# Patient Record
Sex: MALE | HISPANIC OR LATINO | Employment: FULL TIME | URBAN - METROPOLITAN AREA
[De-identification: names, ages, dates, MRNs, and addresses within clinical notes are randomized per-mention and may not be internally consistent; named-entity substitution may affect disease eponyms.]

---
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
## 2024-03-05 ENCOUNTER — TELEPHONE (OUTPATIENT)
Dept: INTERNAL MEDICINE | Facility: CLINIC | Age: 19
End: 2024-03-05

## 2024-03-05 ENCOUNTER — OFFICE VISIT (OUTPATIENT)
Dept: INTERNAL MEDICINE | Facility: CLINIC | Age: 19
End: 2024-03-05
Payer: COMMERCIAL

## 2024-03-05 ENCOUNTER — PATIENT MESSAGE (OUTPATIENT)
Dept: INTERNAL MEDICINE | Facility: CLINIC | Age: 19
End: 2024-03-05

## 2024-03-05 VITALS
WEIGHT: 190 LBS | HEART RATE: 92 BPM | TEMPERATURE: 98 F | HEIGHT: 67 IN | DIASTOLIC BLOOD PRESSURE: 64 MMHG | SYSTOLIC BLOOD PRESSURE: 106 MMHG | BODY MASS INDEX: 29.82 KG/M2

## 2024-03-05 DIAGNOSIS — K13.79 MOUTH SORE: Primary | ICD-10-CM

## 2024-03-05 PROCEDURE — 99213 OFFICE O/P EST LOW 20 MIN: CPT | Performed by: STUDENT IN AN ORGANIZED HEALTH CARE EDUCATION/TRAINING PROGRAM

## 2024-03-05 RX ORDER — TRIAMCINOLONE ACETONIDE 0.1 %
PASTE (GRAM) DENTAL 2 TIMES DAILY
Qty: 5 G | Refills: 1 | Status: SHIPPED | OUTPATIENT
Start: 2024-03-05

## 2024-03-05 RX ORDER — DIPHENHYDRAMINE, LIDOCAINE, NYSTATIN
5 KIT ORAL 4 TIMES DAILY
Qty: 200 ML | Refills: 1 | Status: SHIPPED | OUTPATIENT
Start: 2024-03-05

## 2024-03-05 NOTE — PROGRESS NOTES
Chief Complaint  Srinivas Denney is a 19 y.o. male presenting for Mouth Lesions.     Born in [facility], moved to [place] when he was 3 months old. Moved to [place] 2019. Lives with parents. Single. 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     Patient has a past Chiara history of mild intermittent asthma, seasonal allergies and acne (was on Accutane by Derm).    History of Present Illness  Patient is in sophomore year of college, he is currently doing finals, but was able to come in for in person visit today.    Patient is here for acute visit due to 1.5 weeks of sore office mouth on the right side, just above the back molar.  See also image patient sent through [employer].  He does not recall any episodes of biting himself, just noticed this developing.  He thinks it might be from a bite.  He has had sores in the past, his previous PCP treated with triamcinolone acetonide oral paste.  He also was treated with a solution for symptom relief, he is not sure if it might have been Magic mouthwash.  He has had mouth sores more on the inside of his lower lip in the past, typically bilateral symptoms, not outside lesions or one-sided lesions.  He is not aware of any history of HSV.  No fever or chills.  No tooth pain.  No congestion.  It hurts when eating.  Feels like a flap that comes in the right.    The following portions of the patient's history were reviewed and updated as appropriate: allergies, current medications, past family history, past medical history, past social history, past surgical history, and problem list.    Image appears to be on the left side, however this also is on the right side, assuming image was captured through the mirror       File Link    Scan on 3/5/2024 0743: [id number]        Key Information    Document ID File Type Document Type Description   [id number] Image Patient Entered Attachment [id number]     Import Information    Attached At Date Time User Dept  "  Encounter Level 3/5/2024  7:43 AM       Encounter    Patient Message on 3/5/24 with Janes Smith MD         Objective  /64 (BP Location: Left arm, Patient Position: Sitting, Cuff Size: Adult)   Pulse 92   Temp 98 °F (36.7 °C) (Temporal)   Ht 170.2 cm (67.01\")   Wt 86.2 kg (190 lb)   BMI 29.75 kg/m²     Physical Exam  Vitals reviewed.   Constitutional:       Appearance: Normal appearance.   HENT:      Head: Normocephalic and atraumatic.      Right Ear: Tympanic membrane, ear canal and external ear normal. There is no impacted cerumen.      Left Ear: Tympanic membrane, ear canal and external ear normal. There is no impacted cerumen.      Nose: Nose normal. No congestion.      Mouth/Throat:      Mouth: Mucous membranes are moist.     Eyes:      Extraocular Movements: Extraocular movements intact.      Conjunctiva/sclera: Conjunctivae normal.   Cardiovascular:      Rate and Rhythm: Normal rate and regular rhythm.      Heart sounds: Normal heart sounds. No murmur heard.  Pulmonary:      Effort: Pulmonary effort is normal.      Breath sounds: Normal breath sounds.   Musculoskeletal:      Cervical back: Neck supple. No tenderness.   Lymphadenopathy:      Cervical: No cervical adenopathy.   Skin:     General: Skin is warm and dry.   Neurological:      Mental Status: He is alert and oriented to person, place, and time. Mental status is at baseline.   Psychiatric:         Behavior: Behavior normal.         Thought Content: Thought content normal.         Assessment/Plan   1. Mouth sore  Could be accidental bite.  Appears to be also small aphthous sore.  Will do trial of triamcinolone paste and Magic mouthwash.  If this continues or does not heal up I would recommend evaluation by dentist, they might be able to accommodate.  Otherwise if this lasts more than 2-3 weeks and he continues to feel the small flap of mucous membrane getting caught between the teeth, I would recommend evaluation by ENT.  He can get " back in touch with me at any time for this.  - triamcinolone (KENALOG) 0.1 % paste; Apply  to teeth 2 (Two) Times a Day.  Dispense: 5 g; Refill: 1  - nystatin susp + lidocaine viscous (MAGIC MOUTHWASH) oral suspension; Swish and swallow 5 mL 4 (Four) Times a Day.  Dispense: 200 mL; Refill: 1      Return for Annual physical 2-3 months.    Janes Smith MD  Family Medicine  03/05/2024

## 2024-03-05 NOTE — TELEPHONE ENCOUNTER
Pharmacy Name: [id number] - [place], [place] - [address] AT [facility] [address] - [phone]  - [phone] FX     Reference Number (if applicable):     Pharmacy representative name:    Pharmacy representative phone number: [phone]     What medication are you calling in regards to: NISTATIN    What question does the pharmacy have: CLARIFY DIRECTIONS    Who is the provider that prescribed the medication: HOLESTOL    Additional notes:

## 2024-03-05 NOTE — TELEPHONE ENCOUNTER
From: Srinivas Denney  Sent: 3/5/2024 9:16 AM EST  To: [facility] [address] [facility] [facility]  Subject: Ulcer inside cheek    Okay, I understand. I have class and midterms today, however if you could provide a doctor’s note then I will gladly make my way over.     Do you have any openings today?

## 2024-03-05 NOTE — TELEPHONE ENCOUNTER
This call was not about directions. They do not do compounded meds. They asked if they could dispense the two ingredients separately and have the patient combine the ingredients at home. I stated that would be fine as long as the patient feels comfortable doing so.

## 2024-03-14 DIAGNOSIS — J45.20 MILD INTERMITTENT ASTHMA WITHOUT COMPLICATION: Chronic | ICD-10-CM

## 2024-03-14 DIAGNOSIS — J30.1 SEASONAL ALLERGIC RHINITIS DUE TO POLLEN: Chronic | ICD-10-CM

## 2024-03-14 RX ORDER — MONTELUKAST SODIUM 10 MG/1
10 TABLET ORAL NIGHTLY
Qty: 30 TABLET | Refills: 5 | Status: SHIPPED | OUTPATIENT
Start: 2024-03-14

## 2024-03-22 DIAGNOSIS — J45.20 MILD INTERMITTENT ASTHMA WITHOUT COMPLICATION: Chronic | ICD-10-CM

## 2024-03-22 DIAGNOSIS — J30.1 SEASONAL ALLERGIC RHINITIS DUE TO POLLEN: Chronic | ICD-10-CM

## 2024-03-22 RX ORDER — MONTELUKAST SODIUM 10 MG/1
10 TABLET ORAL NIGHTLY
Qty: 30 TABLET | Refills: 5 | OUTPATIENT
Start: 2024-03-22

## 2024-06-11 ENCOUNTER — TELEPHONE (OUTPATIENT)
Dept: INTERNAL MEDICINE | Facility: CLINIC | Age: 19
End: 2024-06-11
Payer: COMMERCIAL

## 2024-06-11 ENCOUNTER — HOSPITAL ENCOUNTER (EMERGENCY)
Facility: HOSPITAL | Age: 19
Discharge: HOME | End: 2024-06-11
Attending: EMERGENCY MEDICINE | Admitting: EMERGENCY MEDICINE
Payer: COMMERCIAL

## 2024-06-11 ENCOUNTER — APPOINTMENT (OUTPATIENT)
Facility: HOSPITAL | Age: 19
End: 2024-06-11
Payer: COMMERCIAL

## 2024-06-11 ENCOUNTER — LAB (OUTPATIENT)
Dept: LAB | Facility: HOSPITAL | Age: 19
End: 2024-06-11
Payer: COMMERCIAL

## 2024-06-11 ENCOUNTER — OFFICE VISIT (OUTPATIENT)
Dept: INTERNAL MEDICINE | Facility: CLINIC | Age: 19
End: 2024-06-11
Payer: COMMERCIAL

## 2024-06-11 VITALS
DIASTOLIC BLOOD PRESSURE: 75 MMHG | SYSTOLIC BLOOD PRESSURE: 100 MMHG | BODY MASS INDEX: 29.66 KG/M2 | HEIGHT: 67 IN | OXYGEN SATURATION: 100 % | TEMPERATURE: 98 F | RESPIRATION RATE: 16 BRPM | HEART RATE: 74 BPM | WEIGHT: 189 LBS

## 2024-06-11 VITALS
HEIGHT: 67 IN | HEART RATE: 88 BPM | DIASTOLIC BLOOD PRESSURE: 68 MMHG | BODY MASS INDEX: 29.66 KG/M2 | WEIGHT: 189 LBS | SYSTOLIC BLOOD PRESSURE: 122 MMHG | TEMPERATURE: 98 F

## 2024-06-11 DIAGNOSIS — H53.9 VISION CHANGES: ICD-10-CM

## 2024-06-11 DIAGNOSIS — Z11.59 ENCOUNTER FOR HEPATITIS C SCREENING TEST FOR LOW RISK PATIENT: ICD-10-CM

## 2024-06-11 DIAGNOSIS — R51.9 ACUTE NONINTRACTABLE HEADACHE, UNSPECIFIED HEADACHE TYPE: ICD-10-CM

## 2024-06-11 DIAGNOSIS — R42 DIZZINESS: ICD-10-CM

## 2024-06-11 DIAGNOSIS — H53.8 BLURRED VISION: ICD-10-CM

## 2024-06-11 DIAGNOSIS — R42 DIZZINESS: Primary | ICD-10-CM

## 2024-06-11 DIAGNOSIS — R51.9 NONINTRACTABLE EPISODIC HEADACHE, UNSPECIFIED HEADACHE TYPE: Primary | ICD-10-CM

## 2024-06-11 DIAGNOSIS — R51.9 ACUTE NONINTRACTABLE HEADACHE, UNSPECIFIED HEADACHE TYPE: Primary | ICD-10-CM

## 2024-06-11 LAB
25(OH)D3 SERPL-MCNC: 15.1 NG/ML (ref 30–100)
ALBUMIN SERPL-MCNC: 4.6 G/DL (ref 3.5–5.2)
ALBUMIN SERPL-MCNC: 4.8 G/DL (ref 3.5–5.2)
ALBUMIN/GLOB SERPL: 1.7 G/DL
ALBUMIN/GLOB SERPL: 1.8 G/DL
ALP SERPL-CCNC: 86 U/L (ref 39–117)
ALP SERPL-CCNC: 89 U/L (ref 39–117)
ALT SERPL W P-5'-P-CCNC: 14 U/L (ref 1–41)
ALT SERPL W P-5'-P-CCNC: 16 U/L (ref 1–41)
AMPHET+METHAMPHET UR QL: NEGATIVE
AMPHETAMINES UR QL: NEGATIVE
ANION GAP SERPL CALCULATED.3IONS-SCNC: 10.6 MMOL/L (ref 5–15)
ANION GAP SERPL CALCULATED.3IONS-SCNC: 10.9 MMOL/L (ref 5–15)
AST SERPL-CCNC: 17 U/L (ref 1–40)
AST SERPL-CCNC: 21 U/L (ref 1–40)
BARBITURATES UR QL SCN: NEGATIVE
BASOPHILS # BLD AUTO: 0.03 10*3/MM3 (ref 0–0.2)
BASOPHILS # BLD AUTO: 0.05 10*3/MM3 (ref 0–0.2)
BASOPHILS NFR BLD AUTO: 0.5 % (ref 0–1.5)
BASOPHILS NFR BLD AUTO: 0.8 % (ref 0–1.5)
BENZODIAZ UR QL SCN: NEGATIVE
BILIRUB SERPL-MCNC: 0.3 MG/DL (ref 0–1.2)
BILIRUB SERPL-MCNC: 0.4 MG/DL (ref 0–1.2)
BUN SERPL-MCNC: 16 MG/DL (ref 6–20)
BUN SERPL-MCNC: 18 MG/DL (ref 6–20)
BUN/CREAT SERPL: 18.6 (ref 7–25)
BUN/CREAT SERPL: 22.2 (ref 7–25)
BUPRENORPHINE SERPL-MCNC: NEGATIVE NG/ML
CALCIUM SPEC-SCNC: 9.2 MG/DL (ref 8.6–10.5)
CALCIUM SPEC-SCNC: 9.6 MG/DL (ref 8.6–10.5)
CANNABINOIDS SERPL QL: NEGATIVE
CHLORIDE SERPL-SCNC: 104 MMOL/L (ref 98–107)
CHLORIDE SERPL-SCNC: 105 MMOL/L (ref 98–107)
CHOLEST SERPL-MCNC: 169 MG/DL (ref 0–200)
CO2 SERPL-SCNC: 22.4 MMOL/L (ref 22–29)
CO2 SERPL-SCNC: 24.1 MMOL/L (ref 22–29)
COCAINE UR QL: NEGATIVE
CREAT SERPL-MCNC: 0.81 MG/DL (ref 0.76–1.27)
CREAT SERPL-MCNC: 0.86 MG/DL (ref 0.76–1.27)
DEPRECATED RDW RBC AUTO: 37.4 FL (ref 37–54)
DEPRECATED RDW RBC AUTO: 38.8 FL (ref 37–54)
EGFRCR SERPLBLD CKD-EPI 2021: 127.1 ML/MIN/1.73
EGFRCR SERPLBLD CKD-EPI 2021: 129.4 ML/MIN/1.73
EOSINOPHIL # BLD AUTO: 0.18 10*3/MM3 (ref 0–0.4)
EOSINOPHIL # BLD AUTO: 0.19 10*3/MM3 (ref 0–0.4)
EOSINOPHIL NFR BLD AUTO: 2.9 % (ref 0.3–6.2)
EOSINOPHIL NFR BLD AUTO: 3 % (ref 0.3–6.2)
ERYTHROCYTE [DISTWIDTH] IN BLOOD BY AUTOMATED COUNT: 12.4 % (ref 12.3–15.4)
ERYTHROCYTE [DISTWIDTH] IN BLOOD BY AUTOMATED COUNT: 12.9 % (ref 12.3–15.4)
FENTANYL UR-MCNC: NEGATIVE NG/ML
GLOBULIN UR ELPH-MCNC: 2.7 GM/DL
GLOBULIN UR ELPH-MCNC: 2.7 GM/DL
GLUCOSE SERPL-MCNC: 102 MG/DL (ref 65–99)
GLUCOSE SERPL-MCNC: 81 MG/DL (ref 65–99)
HBA1C MFR BLD: 5.3 % (ref 4.8–5.6)
HCT VFR BLD AUTO: 40.4 % (ref 37.5–51)
HCT VFR BLD AUTO: 42.9 % (ref 37.5–51)
HCV AB SER QL: NORMAL
HDLC SERPL-MCNC: 47 MG/DL (ref 40–60)
HGB BLD-MCNC: 13.9 G/DL (ref 13–17.7)
HGB BLD-MCNC: 14.7 G/DL (ref 13–17.7)
HOLD SPECIMEN: NORMAL
IMM GRANULOCYTES # BLD AUTO: 0 10*3/MM3 (ref 0–0.05)
IMM GRANULOCYTES # BLD AUTO: 0.02 10*3/MM3 (ref 0–0.05)
IMM GRANULOCYTES NFR BLD AUTO: 0 % (ref 0–0.5)
IMM GRANULOCYTES NFR BLD AUTO: 0.3 % (ref 0–0.5)
LDLC SERPL CALC-MCNC: 93 MG/DL (ref 0–100)
LDLC/HDLC SERPL: 1.88 {RATIO}
LYMPHOCYTES # BLD AUTO: 2.7 10*3/MM3 (ref 0.7–3.1)
LYMPHOCYTES # BLD AUTO: 2.7 10*3/MM3 (ref 0.7–3.1)
LYMPHOCYTES NFR BLD AUTO: 41.7 % (ref 19.6–45.3)
LYMPHOCYTES NFR BLD AUTO: 45.6 % (ref 19.6–45.3)
MCH RBC QN AUTO: 28.5 PG (ref 26.6–33)
MCH RBC QN AUTO: 28.5 PG (ref 26.6–33)
MCHC RBC AUTO-ENTMCNC: 34.3 G/DL (ref 31.5–35.7)
MCHC RBC AUTO-ENTMCNC: 34.4 G/DL (ref 31.5–35.7)
MCV RBC AUTO: 83 FL (ref 79–97)
MCV RBC AUTO: 83.3 FL (ref 79–97)
METHADONE UR QL SCN: NEGATIVE
MONOCYTES # BLD AUTO: 0.52 10*3/MM3 (ref 0.1–0.9)
MONOCYTES # BLD AUTO: 0.53 10*3/MM3 (ref 0.1–0.9)
MONOCYTES NFR BLD AUTO: 8 % (ref 5–12)
MONOCYTES NFR BLD AUTO: 9 % (ref 5–12)
NEUTROPHILS NFR BLD AUTO: 2.44 10*3/MM3 (ref 1.7–7)
NEUTROPHILS NFR BLD AUTO: 3.04 10*3/MM3 (ref 1.7–7)
NEUTROPHILS NFR BLD AUTO: 41.3 % (ref 42.7–76)
NEUTROPHILS NFR BLD AUTO: 46.9 % (ref 42.7–76)
NRBC BLD AUTO-RTO: 0 /100 WBC (ref 0–0.2)
OPIATES UR QL: NEGATIVE
OXYCODONE UR QL SCN: NEGATIVE
PCP UR QL SCN: NEGATIVE
PLATELET # BLD AUTO: 256 10*3/MM3 (ref 140–450)
PLATELET # BLD AUTO: 290 10*3/MM3 (ref 140–450)
PMV BLD AUTO: 9.4 FL (ref 6–12)
PMV BLD AUTO: 9.5 FL (ref 6–12)
POTASSIUM SERPL-SCNC: 3.7 MMOL/L (ref 3.5–5.2)
POTASSIUM SERPL-SCNC: 4 MMOL/L (ref 3.5–5.2)
PROT SERPL-MCNC: 7.3 G/DL (ref 6–8.5)
PROT SERPL-MCNC: 7.5 G/DL (ref 6–8.5)
RBC # BLD AUTO: 4.87 10*6/MM3 (ref 4.14–5.8)
RBC # BLD AUTO: 5.15 10*6/MM3 (ref 4.14–5.8)
SODIUM SERPL-SCNC: 137 MMOL/L (ref 136–145)
SODIUM SERPL-SCNC: 140 MMOL/L (ref 136–145)
TRICYCLICS UR QL SCN: NEGATIVE
TRIGL SERPL-MCNC: 169 MG/DL (ref 0–150)
TROPONIN T SERPL HS-MCNC: 7 NG/L
TSH SERPL DL<=0.05 MIU/L-ACNC: 1.53 UIU/ML (ref 0.27–4.2)
VIT B12 BLD-MCNC: 405 PG/ML (ref 211–946)
VLDLC SERPL-MCNC: 29 MG/DL (ref 5–40)
WBC NRBC COR # BLD AUTO: 5.92 10*3/MM3 (ref 3.4–10.8)
WBC NRBC COR # BLD AUTO: 6.48 10*3/MM3 (ref 3.4–10.8)
WHOLE BLOOD HOLD COAG: NORMAL
WHOLE BLOOD HOLD SPECIMEN: NORMAL

## 2024-06-11 PROCEDURE — 83036 HEMOGLOBIN GLYCOSYLATED A1C: CPT

## 2024-06-11 PROCEDURE — 82306 VITAMIN D 25 HYDROXY: CPT

## 2024-06-11 PROCEDURE — 80050 GENERAL HEALTH PANEL: CPT

## 2024-06-11 PROCEDURE — 84484 ASSAY OF TROPONIN QUANT: CPT | Performed by: PHYSICIAN ASSISTANT

## 2024-06-11 PROCEDURE — 86803 HEPATITIS C AB TEST: CPT

## 2024-06-11 PROCEDURE — 80053 COMPREHEN METABOLIC PANEL: CPT | Performed by: PHYSICIAN ASSISTANT

## 2024-06-11 PROCEDURE — 70551 MRI BRAIN STEM W/O DYE: CPT

## 2024-06-11 PROCEDURE — 99214 OFFICE O/P EST MOD 30 MIN: CPT

## 2024-06-11 PROCEDURE — 85025 COMPLETE CBC W/AUTO DIFF WBC: CPT | Performed by: PHYSICIAN ASSISTANT

## 2024-06-11 PROCEDURE — 80307 DRUG TEST PRSMV CHEM ANLYZR: CPT | Performed by: PHYSICIAN ASSISTANT

## 2024-06-11 PROCEDURE — 80061 LIPID PANEL: CPT

## 2024-06-11 PROCEDURE — 82607 VITAMIN B-12: CPT

## 2024-06-11 PROCEDURE — 99284 EMERGENCY DEPT VISIT MOD MDM: CPT

## 2024-06-11 PROCEDURE — 93005 ELECTROCARDIOGRAM TRACING: CPT | Performed by: PHYSICIAN ASSISTANT

## 2024-06-11 RX ORDER — SODIUM CHLORIDE 0.9 % (FLUSH) 0.9 %
10 SYRINGE (ML) INJECTION AS NEEDED
Status: DISCONTINUED | OUTPATIENT
Start: 2024-06-11 | End: 2024-06-11 | Stop reason: HOSPADM

## 2024-06-11 NOTE — TELEPHONE ENCOUNTER
THE PATIENTS MOTHER STATES THAT HE JUST SAW KEVIN GUZMANER TODAY AND WAS ADVISED TO GET AN EYE EXAM THE PATIENTS MOTHER STATES THAT THE PATIENT CAN'T GET A SAME DAY EYE APPOINTMENT SHE HAS CALLED SEVERAL OFFICE AND NO ONE CAN SEE HIM TODAY THE PATIENTS MOTHER STATES THAT THE EARLIEST APPOINTMENT THAT SHE CAN GET IS FRIDAY

## 2024-06-11 NOTE — TELEPHONE ENCOUNTER
Patients mom notified. I advised her to check [employer]. The website showed 15 openings for this afternoon.

## 2024-06-11 NOTE — PROGRESS NOTES
"    Acute Office Visit      Name: Srinivas Denney    : 2005     MRN: [id number]   Care Team: Patient Care Team:  Janes Smith MD as PCP - General (Family Medicine)    Chief Complaint  Dizziness (Ongoing for a month. ), Eye Problem (During the dizzy spells the his vision becomes blurry. ), and Headache    Subjective     History of Present Illness:    Srinivas is a pleasant 19-year-old male who presents today for dizziness, frequent headaches, and visual changes.  He reports these symptoms for the past 4 to 5 weeks.    He has accompanied to his appointment today by an adult female.    Srinivas states that he can have dizzy spells at all different times of the day.  This occurs 3-4 times a week.  He describes the dizziness as the ground moving beneath him.  He typically will sit down at the kitchen table for a few minutes until symptoms resolve.  He has not noticed if position changes, such as lying down, improves his symptoms.    During these dizzy spells, he can also note some visual changes.  The visual changes will occasionally remain after dizziness has resolved.  He states that it can be both eyes affected however, today, his right eye is slightly blurry.  He states that the blurry vision typically last 2-4 hours and then his vision clears.  He denies a recent eye exam to have his vision checked.    Srinivas also states that he has been having \"pounding\" headaches in his forehead area for approximately 3-4 weeks.  He states that the pain is unbearable and requires him to squeeze his eyes shut.  He states that these headaches can last all day.  He does not have any improvement with a dark or quiet room, or the use of ibuprofen 800 mg.  He states that the headache just eventually goes away later in the day.    Review of Systems:  Dizziness, visual changes, headaches  Past Medical History:   Diagnosis Date    Acne vulgaris 2022    Was on Accutane by Derm in the past    Mild intermittent asthma " "without complication 11/18/2022    Worse with exercise, infections and allergy season.    Seasonal allergic rhinitis due to pollen 11/18/2022    Spring, fall, wintherr       History reviewed. No pertinent surgical history.    Social History     Socioeconomic History    Marital status: Single   Tobacco Use    Smoking status: Never    Smokeless tobacco: Never   Vaping Use    Vaping status: Never Used   Substance and Sexual Activity    Alcohol use: Never    Drug use: Never    Sexual activity: Defer         Current Outpatient Medications:     albuterol sulfate  (90 Base) MCG/ACT inhaler, Inhale 2 puffs Every 4 (Four) Hours As Needed for Wheezing., Disp: 18 g, Rfl: 3    cyclobenzaprine (FLEXERIL) 10 MG tablet, Take 0.5-1 tablets by mouth 2 (Two) Times a Day As Needed for Muscle Spasms., Disp: 30 tablet, Rfl: 1    montelukast (SINGULAIR) 10 MG tablet, Take 1 tablet by mouth Every Night., Disp: 30 tablet, Rfl: 5    nystatin (MYCOSTATIN) 100,000 unit/mL suspension, MIX WITH EQUAL VOLUME OF VISCOUS LIDOCAINE SWISH AND SWALLOW 5 MILLILITERS FOUR TIMES A DAY, Disp: , Rfl:     triamcinolone (KENALOG) 0.1 % paste, Apply  to teeth 2 (Two) Times a Day., Disp: 5 g, Rfl: 1    Procedures    PHQ-9 Total Score:      Objective     Vital Signs  /68 (BP Location: Left arm, Patient Position: Sitting, Cuff Size: Adult)   Pulse 88   Temp 98 °F (36.7 °C) (Temporal)   Ht 170.2 cm (67.01\")   Wt 85.7 kg (189 lb)   BMI 29.59 kg/m²   Estimated body mass index is 29.59 kg/m² as calculated from the following:    Height as of this encounter: 170.2 cm (67.01\").    Weight as of this encounter: 85.7 kg (189 lb).    BMI is >= 25 and <30. (Overweight) The following options were offered after discussion;: exercise counseling/recommendations and nutrition counseling/recommendations      Physical Exam  Vitals and nursing note reviewed.   HENT:      Head: Normocephalic.      Right Ear: Tympanic membrane, ear canal and external ear normal.    "   Left Ear: Tympanic membrane, ear canal and external ear normal.      Nose: Nose normal.   Eyes:      Pupils: Pupils are equal, round, and reactive to light.   Cardiovascular:      Rate and Rhythm: Normal rate.   Pulmonary:      Effort: Pulmonary effort is normal.      Breath sounds: Normal breath sounds.   Skin:     General: Skin is warm and dry.   Neurological:      General: No focal deficit present.      Mental Status: He is alert and oriented to person, place, and time.      Cranial Nerves: Cranial nerves 2-12 are intact.      Sensory: Sensation is intact.      Motor: Motor function is intact.      Coordination: Coordination is intact.      Gait: Gait is intact.   Psychiatric:         Mood and Affect: Mood normal.         Behavior: Behavior normal.         Thought Content: Thought content normal.         Judgment: Judgment normal.          Assessment and Plan     Assessment/Plan:  Diagnoses and all orders for this visit:    1. Acute nonintractable headache, unspecified headache type (Primary)  -     CBC & Differential; Future  -     Comprehensive Metabolic Panel; Future  -     Lipid Panel; Future  -     TSH Rfx On Abnormal To Free T4; Future  -     Vitamin D,25-Hydroxy; Future  -     Vitamin B12; Future  -     Hemoglobin A1c; Future  -Will proceed with a full lab evaluation today.  -Encouraged to seek a same-day eye exam for vision evaluation.  -Will evaluate these then proceed with plan.  -Informed Srinivas of plan and he is agreeable.    2. Dizziness  -     CBC & Differential; Future  -     Comprehensive Metabolic Panel; Future  -     Lipid Panel; Future  -     TSH Rfx On Abnormal To Free T4; Future  -     Vitamin D,25-Hydroxy; Future  -     Vitamin B12; Future  -     Hemoglobin A1c; Future    3. Vision changes  -     CBC & Differential; Future  -     Comprehensive Metabolic Panel; Future  -     Lipid Panel; Future  -     TSH Rfx On Abnormal To Free T4; Future  -     Vitamin D,25-Hydroxy; Future  -      Vitamin B12; Future  -     Hemoglobin A1c; Future    4. Encounter for hepatitis C screening test for low risk patient  -     Hepatitis C Antibody; Future       There are no Patient Instructions on file for this visit.  Plan of care reviewed with patient at the conclusion of today's visit. Education was provided regarding diagnosis, management and any prescribed or recommended OTC medications.  Patient verbalizes understanding of and agreement with management plan.    Follow Up  Return for Next Scheduled Follow up.    Amelia Youssef, APRN

## 2024-06-11 NOTE — FSED PROVIDER NOTE
Subjective   History of Present Illness  A 19-year-old male presents the ER with intermittent headaches and blurry vision for the past month.  Patient denies any injuries or trauma to his head.  He states he has a history of headaches and has been seen by neurologist in the past.  Patient states he does not currently have a headache.  He is complaining of some blurry vision in his right eye.  He denies any treatment prior to arrival.    History provided by:  Patient and relative      Review of Systems   Constitutional:  Negative for chills and fever.   HENT:  Negative for ear pain, rhinorrhea and sore throat.    Eyes:         Blurred vision   Respiratory:  Negative for cough and shortness of breath.    Cardiovascular:  Negative for chest pain.   Gastrointestinal:  Negative for abdominal pain, diarrhea, nausea and vomiting.   Genitourinary:  Negative for dysuria.   Musculoskeletal:  Negative for back pain and neck pain.   Neurological:  Positive for headaches.   All other systems reviewed and are negative.      Past Medical History:   Diagnosis Date    Acne vulgaris 11/18/2022    Was on Accutane by Derm in the past    Mild intermittent asthma without complication 11/18/2022    Worse with exercise, infections and allergy season.    Seasonal allergic rhinitis due to pollen 11/18/2022    Spring, fall, wintherr       No Known Allergies    History reviewed. No pertinent surgical history.    History reviewed. No pertinent family history.    Social History     Socioeconomic History    Marital status: Single   Tobacco Use    Smoking status: Never    Smokeless tobacco: Never   Vaping Use    Vaping status: Never Used   Substance and Sexual Activity    Alcohol use: Never    Drug use: Never    Sexual activity: Defer           Objective   Physical Exam  Vitals and nursing note reviewed.   Constitutional:       Appearance: Normal appearance. He is normal weight.   HENT:      Head: Normocephalic and atraumatic.   Eyes:       Extraocular Movements: Extraocular movements intact.      Pupils: Pupils are equal, round, and reactive to light.   Cardiovascular:      Rate and Rhythm: Normal rate and regular rhythm.      Pulses: Normal pulses.   Pulmonary:      Effort: Pulmonary effort is normal.      Breath sounds: Normal breath sounds.   Abdominal:      General: Abdomen is flat. Bowel sounds are normal.      Palpations: Abdomen is soft.   Musculoskeletal:         General: Normal range of motion.   Skin:     General: Skin is warm and dry.   Neurological:      General: No focal deficit present.      Mental Status: He is alert and oriented to person, place, and time.   Psychiatric:         Mood and Affect: Mood normal.         Procedures           ED Course  ED Course as of 06/11/24 1634   Tue Jun 11, 2024   1445 Glucose(!): 102 [WA]   1446 Creatinine: 0.81 [WA]   1446 BUN: 18 [WA]   1446 Potassium: 3.7 [WA]   1446 Sodium: 137 [WA]   1446 HS Troponin T: 7 [WA]   1446 WBC: 6.48 [WA]   1446 Hemoglobin: 13.9 [WA]   1446 Hematocrit: 40.4 [WA]      ED Course User Index  [WA] Nel Goldstein PA-C                                           Medical Decision Making  Patient was evaluated, labs and imaging were obtained.  No acute abnormalities were noted.  Patient advised to follow-up with neurology, call to schedule an appointment.  Return to the ER for worsening of symptoms.  Patient has no further questions or concerns at discharge.     Amount and/or Complexity of Data Reviewed  Labs: ordered. Decision-making details documented in ED Course.  Radiology: ordered.  ECG/medicine tests: ordered.    Risk  Prescription drug management.        Final diagnoses:   Nonintractable episodic headache, unspecified headache type   Blurred vision       ED Disposition  ED Disposition       ED Disposition   Discharge    Condition   Stable    Comment   --               [facility]  [address] [place] [id number]  [phone]  Schedule an  appointment as soon as possible for a visit            Medication List      No changes were made to your prescriptions during this visit.

## 2024-06-11 NOTE — TELEPHONE ENCOUNTER
Caller: LUIS GRANADOS    Relationship: Mother    Best call back number: [phone]     What is the medical concern/diagnosis: BLURRY VISION    What specialty or service is being requested: NEUROLOGY    Any additional details: PATIENTS MOTHER STATES THAT HE WENT TO THE EMERGENCY ROOM ON 6/11/24 AND HE HAD AN MRI WHICH WAS NEGATIVE AND TOLD HIM HE NEEDS TO SEE A NEUROLOGIST WITHIN THE NEXT 2 DAYS

## 2024-06-12 DIAGNOSIS — E55.9 VITAMIN D DEFICIENCY: Primary | ICD-10-CM

## 2024-06-12 RX ORDER — ERGOCALCIFEROL 1.25 MG/1
50000 CAPSULE ORAL WEEKLY
Qty: 5 CAPSULE | Refills: 2 | Status: SHIPPED | OUTPATIENT
Start: 2024-06-12

## 2024-06-12 NOTE — TELEPHONE ENCOUNTER
"LVM for pt's mother stating the following:    \"Referral was placed for neurology. Someone will call to schedule the appt.\"   "

## 2024-06-13 LAB
QT INTERVAL: 394 MS
QTC INTERVAL: 403 MS

## 2024-06-25 ENCOUNTER — OFFICE VISIT (OUTPATIENT)
Dept: NEUROLOGY | Facility: CLINIC | Age: 19
End: 2024-06-25
Payer: COMMERCIAL

## 2024-06-25 ENCOUNTER — LAB (OUTPATIENT)
Dept: LAB | Facility: HOSPITAL | Age: 19
End: 2024-06-25
Payer: COMMERCIAL

## 2024-06-25 VITALS
HEIGHT: 67 IN | DIASTOLIC BLOOD PRESSURE: 72 MMHG | SYSTOLIC BLOOD PRESSURE: 112 MMHG | WEIGHT: 187.4 LBS | OXYGEN SATURATION: 98 % | BODY MASS INDEX: 29.41 KG/M2 | HEART RATE: 84 BPM

## 2024-06-25 DIAGNOSIS — H53.8 BLURRED VISION, RIGHT EYE: ICD-10-CM

## 2024-06-25 DIAGNOSIS — H53.8 BLURRED VISION, RIGHT EYE: Primary | ICD-10-CM

## 2024-06-25 DIAGNOSIS — G43.C0 PERIODIC HEADACHE SYNDROME, NOT INTRACTABLE: ICD-10-CM

## 2024-06-25 LAB
25(OH)D3 SERPL-MCNC: 24.2 NG/ML (ref 30–100)
CHROMATIN AB SERPL-ACNC: 10.9 IU/ML (ref 0–14)
CRP SERPL-MCNC: 0.31 MG/DL (ref 0–0.5)
FOLATE SERPL-MCNC: 11.3 NG/ML (ref 4.78–24.2)
VIT B12 BLD-MCNC: 479 PG/ML (ref 211–946)

## 2024-06-25 PROCEDURE — 84165 PROTEIN E-PHORESIS SERUM: CPT

## 2024-06-25 PROCEDURE — 82784 ASSAY IGA/IGD/IGG/IGM EACH: CPT

## 2024-06-25 PROCEDURE — 36415 COLL VENOUS BLD VENIPUNCTURE: CPT

## 2024-06-25 PROCEDURE — 86431 RHEUMATOID FACTOR QUANT: CPT

## 2024-06-25 PROCEDURE — 86334 IMMUNOFIX E-PHORESIS SERUM: CPT

## 2024-06-25 PROCEDURE — 84155 ASSAY OF PROTEIN SERUM: CPT

## 2024-06-25 PROCEDURE — 82746 ASSAY OF FOLIC ACID SERUM: CPT

## 2024-06-25 PROCEDURE — 82306 VITAMIN D 25 HYDROXY: CPT

## 2024-06-25 PROCEDURE — 86362 MOG-IGG1 ANTB CBA EACH: CPT

## 2024-06-25 PROCEDURE — 82164 ANGIOTENSIN I ENZYME TEST: CPT

## 2024-06-25 PROCEDURE — 82607 VITAMIN B-12: CPT

## 2024-06-25 PROCEDURE — 99214 OFFICE O/P EST MOD 30 MIN: CPT | Performed by: NURSE PRACTITIONER

## 2024-06-25 PROCEDURE — 86140 C-REACTIVE PROTEIN: CPT

## 2024-06-25 PROCEDURE — 86053 AQAPRN-4 ANTB FLO CYTMTRY EA: CPT

## 2024-06-25 PROCEDURE — 86038 ANTINUCLEAR ANTIBODIES: CPT

## 2024-06-25 PROCEDURE — 86148 ANTI-PHOSPHOLIPID ANTIBODY: CPT

## 2024-06-25 NOTE — LETTER
2024     CRISTO Melchor  [address]  [address]  [facility] [address]    Patient: Srinivas Denney   YOB: 2005   Date of Visit: 2024     Dear CRISTO Melchor:       Thank you for referring Srinivas Denney to me for evaluation. Below are the relevant portions of my assessment and plan of care.    If you have questions, please do not hesitate to call me. I look forward to following Srinivas along with you.         Sincerely,        Brody Hobbs DNP, APRN        CC: No Recipients    Brody Hobbs DNP, APRN  24 1224  Signed     Neuro Office Visit      Encounter Date: 2024   Patient Name: Srinivas Denney  : 2005   MRN: [id number]   PCP: Dr Smith  Chief Complaint:    Chief Complaint   Patient presents with   • Headache   • Vision changes       History of Present Illness: Srinivas Denney is a 19 y.o. male who is here today in Neurology w his mother for  dizziness and headache    History of Present Illness  The patient is a 19-year-old who presents for evaluation of headaches and vision changes. He is accompanied by his mother.      Headaches  The patient's mother reports a longstanding history of headaches dating back to his childhood, which were attributed to allergies and sinus issues. He consulted a neurologist around the age of 9 or 10, who diagnosed him with migraines. Despite the absence of photophobia and phonophobia, the neurologist recommended Botox and cardiac medication, which the patient declined.     Currently, he experiences 4 to 8 headaches per month, localized in the frontal and temporal regions, described as a internal pressure sensation. The headaches can last between 20 to 30 minutes to a few hours. He occasionally experiences nausea, but denies any vomiting, photophobia, or noise sensitivity. He occasionally experiences dizziness upon changing positions, but denies any tinnitus.     He attempts to manage his  headaches by drinking water, but refrains from taking Tylenol or ibuprofen due to personal preference. His last ophthalmological examination was conducted 2 weeks ago. He has previously used over-the-counter Tylenol and ibuprofen for his headaches. An MRI was performed at the ER. The headaches do not interfere with his daily activities or school.      Vision changes    Approximately 1.5 months ago, the patient began experiencing blurred vision in both eyes, one at a time. Currently only has blurred vision OD which has persisted for over 2 weeks. He reports mild visual impairment in the peripheral vision. He denies any ocular symptoms such as sparkles or fireworks, pain during eye movement, or redness. He also denies any neurological symptoms such as numbness, tingling, slurred speech, dysphagia, or coordination issues.    Supplemental Information  He was hospitalized for 14 days when he was 2.5 years old because he had a fever that would not go down.  • He denies any tobacco, alcohol, or drug use. He is a student at [employer] and is majoring in business. He is single and lives with his mother.  • He denies any family history of autoimmune disorders such as diabetes, lupus, or thyroid disease.  • The patient has no known allergies.       PMH: allergies, asthma  FH:-neg  SH:-tob, -etoh, -drug  Subjective      Past Medical History:   Past Medical History:   Diagnosis Date   • Acne vulgaris 11/18/2022    Was on Accutane by Derm in the past   • Mild intermittent asthma without complication 11/18/2022    Worse with exercise, infections and allergy season.   • Seasonal allergic rhinitis due to pollen 11/18/2022    Spring, fall, wintherr       Past Surgical History: History reviewed. No pertinent surgical history.    Family History:   Family History   Problem Relation Age of Onset   • No Known Problems Mother    • No Known Problems Father        Social History:   Social History     Socioeconomic History   • Marital status: Single    Tobacco Use   • Smoking status: Never   • Smokeless tobacco: Never   Vaping Use   • Vaping status: Never Used   Substance and Sexual Activity   • Alcohol use: Never   • Drug use: Never   • Sexual activity: Defer       Medications:     Current Outpatient Medications:   •  albuterol sulfate  (90 Base) MCG/ACT inhaler, Inhale 2 puffs Every 4 (Four) Hours As Needed for Wheezing., Disp: 18 g, Rfl: 3  •  cyclobenzaprine (FLEXERIL) 10 MG tablet, Take 0.5-1 tablets by mouth 2 (Two) Times a Day As Needed for Muscle Spasms., Disp: 30 tablet, Rfl: 1  •  nystatin (MYCOSTATIN) 100,000 unit/mL suspension, MIX WITH EQUAL VOLUME OF VISCOUS LIDOCAINE SWISH AND SWALLOW 5 MILLILITERS FOUR TIMES A DAY, Disp: , Rfl:   •  triamcinolone (KENALOG) 0.1 % paste, Apply  to teeth 2 (Two) Times a Day., Disp: 5 g, Rfl: 1  •  vitamin D (ERGOCALCIFEROL) 1.25 MG (77161 UT) capsule capsule, Take 1 capsule by mouth 1 (One) Time Per Week., Disp: 5 capsule, Rfl: 2    Allergies:   No Known Allergies    PHQ-9 Total Score:     [facility].    Objective     Physical Exam:   Physical Exam  Eyes:      Pupils: Pupils are equal, round, and reactive to light.   Neurological:      Mental Status: He is oriented to person, place, and time.      Coordination: Heel to Shin Test and Romberg Test normal.      Gait: Gait is intact. Tandem walk normal.      Deep Tendon Reflexes:      Reflex Scores:       Tricep reflexes are 2+ on the right side and 2+ on the left side.       Bicep reflexes are 2+ on the right side and 2+ on the left side.       Brachioradialis reflexes are 2+ on the right side and 2+ on the left side.       Patellar reflexes are 2+ on the right side and 2+ on the left side.       Achilles reflexes are 2+ on the right side and 2+ on the left side.  Psychiatric:         Speech: Speech normal.         Neurologic Exam     Mental Status   Oriented to person, place, and time.   Follows 3 step commands.  "  Attention: normal. Concentration: normal.   Speech: speech is normal   Level of consciousness: alert  Knowledge: consistent with education.   Normal comprehension.     Cranial Nerves     CN III, IV, VI   Pupils are equal, round, and reactive to light.  Right pupil: Accommodation: intact.   Left pupil: Accommodation: intact.   CN III: no CN III palsy  CN VI: no CN VI palsy  Nystagmus: none   Diplopia: none  Upgaze: normal  Downgaze: normal  Conjugate gaze: present    CN VII   Facial expression full, symmetric.     CN VIII   Hearing: intact    CN XII   CN XII normal.     Motor Exam   Muscle bulk: normal  Overall muscle tone: normal    Strength   Right biceps: 5/5  Left biceps: 5/5  Right triceps: 5/5  Left triceps: 5/5  Right interossei: 5/5  Left interossei: 5/5  Right quadriceps: 5/5  Left quadriceps: 5/5  Right anterior tibial: 5/5  Left anterior tibial: 5/5  Right posterior tibial: 5/5  Left posterior tibial: 5/5    Sensory Exam   Light touch normal.     Gait, Coordination, and Reflexes     Gait  Gait: normal    Coordination   Romberg: negative  Heel to shin coordination: normal  Tandem walking coordination: normal    Tremor   Resting tremor: absent  Action tremor: absent    Reflexes   Right brachioradialis: 2+  Left brachioradialis: 2+  Right biceps: 2+  Left biceps: 2+  Right triceps: 2+  Left triceps: 2+  Right patellar: 2+  Left patellar: 2+  Right achilles: 2+  Left achilles: 2+  Right : 2+  Left : 2+     Physical Exam        Vital Signs:   Vitals:    06/25/24 0917   BP: 112/72   Pulse: 84   SpO2: 98%   Weight: 85 kg (187 lb 6.4 oz)   Height: 170.2 cm (67.01\")     Body mass index is 29.34 kg/m².         Assessment / Plan      Assessment/Plan:   Diagnoses and all orders for this visit:    1. Blurred vision, right eye (Primary)  -     LESTER by IFA, Reflex 9-biomarkers profile; Future  -     Angiotensin Converting Enzyme; Future  -     Anti-Myelin Oligodendrocyte Glycoprotein (MOG), Serum; Future  -     " Neuromyelitis Optica (NMO) Auto Antibody, IgG; Future  -     Vitamin D,25-Hydroxy; Future  -     Vitamin B12 & Folate; Future  -     Rheumatoid Factor; Future  -     Antiphosphatidylserine IgG / M; Future  -     C-reactive Protein; Future  -     ROBEL + PE; Future  -     Visual Evoked Potential Test; Future    2. Periodic headache syndrome, not intractable       Assessment & Plan  1. Periodic headache syndrome.  The patient's vital signs, including blood pressure and heart rate, are within normal limits. A comprehensive discussion was held regarding the importance of adhering to a regimen of at least 4 bottles of water daily, abstaining from skipping meals, and ensuring 7 to 8 hours of rest per night. Should the patient experience severe or distinct headaches, he is to inform us immediately.    2. Blurry vision.  A vision evoked potentials test will be ordered to measure the activity and health of the nerve. Blood work will be conducted to assess for autoimmune diseases, specifically neuromyelitis optica disorder. Vitamin D and B12 levels will also be checked. Rheumatoid and inflammatory markers will also be checked. Should the labs and vision evoked potentials test yield normal results, a referral to Dr. Lee at , a neuro-ophthalmologist, will be considered.        Patient Education:       Reviewed medications, potential side effects and signs and symptoms to report. Discussed risk versus benefits of treatment plan with patient and/or family-including medications, labs and radiology that may be ordered. Addressed questions and concerns during visit. Patient and/or family verbalized understanding and agree with plan. Instructed to call the office with any questions and report to ER with any life-threatening symptoms.     Follow Up:   Return in about 3 months (around 9/25/2024) for Recheck.    During this visit the following were done:  Labs Reviewed [x]    Labs Ordered [x]    Radiology Reports Reviewed [x]     Radiology Ordered []    PCP Records Reviewed []    Referring Provider Records Reviewed []    ER Records Reviewed [x]    Hospital Records Reviewed []    History Obtained From Family [x]    Radiology Images Reviewed [x]    Other Reviewed []    Records Requested []      Patient or patient representative verbalized consent for the use of Ambient Listening during the visit with  Brody Hobbs DNP, APRN for chart documentation. 6/25/2024  08:34 EDT      Brody Hobbs DNP, APRN

## 2024-06-25 NOTE — PROGRESS NOTES
Neuro Office Visit      Encounter Date: 2024   Patient Name: Srinivas Denney  : 2005   MRN: [id number]   PCP: Dr Smith  Chief Complaint:    Chief Complaint   Patient presents with    Headache    Vision changes       History of Present Illness: Srinivas Denney is a 19 y.o. male who is here today in Neurology w his mother for  dizziness and headache    History of Present Illness  The patient is a 19-year-old who presents for evaluation of headaches and vision changes. He is accompanied by his mother.      Headaches  The patient's mother reports a longstanding history of headaches dating back to his childhood, which were attributed to allergies and sinus issues. He consulted a neurologist around the age of 9 or 10, who diagnosed him with migraines. Despite the absence of photophobia and phonophobia, the neurologist recommended Botox and cardiac medication, which the patient declined.     Currently, he experiences 4 to 8 headaches per month, localized in the frontal and temporal regions, described as a internal pressure sensation. The headaches can last between 20 to 30 minutes to a few hours. He occasionally experiences nausea, but denies any vomiting, photophobia, or noise sensitivity. He occasionally experiences dizziness upon changing positions, but denies any tinnitus.     He attempts to manage his headaches by drinking water, but refrains from taking Tylenol or ibuprofen due to personal preference. His last ophthalmological examination was conducted 2 weeks ago. He has previously used over-the-counter Tylenol and ibuprofen for his headaches. An MRI was performed at the ER. The headaches do not interfere with his daily activities or school.      Vision changes    Approximately 1.5 months ago, the patient began experiencing blurred vision in both eyes, one at a time. Currently only has blurred vision OD which has persisted for over 2 weeks. He reports mild visual impairment in the peripheral  vision. He denies any ocular symptoms such as sparkles or fireworks, pain during eye movement, or redness. He also denies any neurological symptoms such as numbness, tingling, slurred speech, dysphagia, or coordination issues.    Supplemental Information  He was hospitalized for 14 days when he was 2.5 years old because he had a fever that would not go down.   He denies any tobacco, alcohol, or drug use. He is a student at [employer] and is majoring in business. He is single and lives with his mother.   He denies any family history of autoimmune disorders such as diabetes, lupus, or thyroid disease.   The patient has no known allergies.       PMH: allergies, asthma  FH:-neg  SH:-tob, -etoh, -drug  Subjective      Past Medical History:   Past Medical History:   Diagnosis Date    Acne vulgaris 11/18/2022    Was on Accutane by Derm in the past    Mild intermittent asthma without complication 11/18/2022    Worse with exercise, infections and allergy season.    Seasonal allergic rhinitis due to pollen 11/18/2022    Spring, fall, wintherr       Past Surgical History: History reviewed. No pertinent surgical history.    Family History:   Family History   Problem Relation Age of Onset    No Known Problems Mother     No Known Problems Father        Social History:   Social History     Socioeconomic History    Marital status: Single   Tobacco Use    Smoking status: Never    Smokeless tobacco: Never   Vaping Use    Vaping status: Never Used   Substance and Sexual Activity    Alcohol use: Never    Drug use: Never    Sexual activity: Defer       Medications:     Current Outpatient Medications:     albuterol sulfate  (90 Base) MCG/ACT inhaler, Inhale 2 puffs Every 4 (Four) Hours As Needed for Wheezing., Disp: 18 g, Rfl: 3    cyclobenzaprine (FLEXERIL) 10 MG tablet, Take 0.5-1 tablets by mouth 2 (Two) Times a Day As Needed for Muscle Spasms., Disp: 30 tablet, Rfl: 1    nystatin (MYCOSTATIN) 100,000 unit/mL suspension, MIX WITH EQUAL  VOLUME OF VISCOUS LIDOCAINE SWISH AND SWALLOW 5 MILLILITERS FOUR TIMES A DAY, Disp: , Rfl:     triamcinolone (KENALOG) 0.1 % paste, Apply  to teeth 2 (Two) Times a Day., Disp: 5 g, Rfl: 1    vitamin D (ERGOCALCIFEROL) 1.25 MG (57540 UT) capsule capsule, Take 1 capsule by mouth 1 (One) Time Per Week., Disp: 5 capsule, Rfl: 2    Allergies:   No Known Allergies    PHQ-9 Total Score:     STEADI Fall Risk Assessment has not been completed.    Objective     Physical Exam:   Physical Exam  Eyes:      Pupils: Pupils are equal, round, and reactive to light.   Neurological:      Mental Status: He is oriented to person, place, and time.      Coordination: Heel to Shin Test and Romberg Test normal.      Gait: Gait is intact. Tandem walk normal.      Deep Tendon Reflexes:      Reflex Scores:       Tricep reflexes are 2+ on the right side and 2+ on the left side.       Bicep reflexes are 2+ on the right side and 2+ on the left side.       Brachioradialis reflexes are 2+ on the right side and 2+ on the left side.       Patellar reflexes are 2+ on the right side and 2+ on the left side.       Achilles reflexes are 2+ on the right side and 2+ on the left side.  Psychiatric:         Speech: Speech normal.         Neurologic Exam     Mental Status   Oriented to person, place, and time.   Follows 3 step commands.   Attention: normal. Concentration: normal.   Speech: speech is normal   Level of consciousness: alert  Knowledge: consistent with education.   Normal comprehension.     Cranial Nerves     CN III, IV, VI   Pupils are equal, round, and reactive to light.  Right pupil: Accommodation: intact.   Left pupil: Accommodation: intact.   CN III: no CN III palsy  CN VI: no CN VI palsy  Nystagmus: none   Diplopia: none  Upgaze: normal  Downgaze: normal  Conjugate gaze: present    CN VII   Facial expression full, symmetric.     CN VIII   Hearing: intact    CN XII   CN XII normal.     Motor Exam   Muscle bulk: normal  Overall muscle tone:  "normal    Strength   Right biceps: 5/5  Left biceps: 5/5  Right triceps: 5/5  Left triceps: 5/5  Right interossei: 5/5  Left interossei: 5/5  Right quadriceps: 5/5  Left quadriceps: 5/5  Right anterior tibial: 5/5  Left anterior tibial: 5/5  Right posterior tibial: 5/5  Left posterior tibial: 5/5    Sensory Exam   Light touch normal.     Gait, Coordination, and Reflexes     Gait  Gait: normal    Coordination   Romberg: negative  Heel to shin coordination: normal  Tandem walking coordination: normal    Tremor   Resting tremor: absent  Action tremor: absent    Reflexes   Right brachioradialis: 2+  Left brachioradialis: 2+  Right biceps: 2+  Left biceps: 2+  Right triceps: 2+  Left triceps: 2+  Right patellar: 2+  Left patellar: 2+  Right achilles: 2+  Left achilles: 2+  Right : 2+  Left : 2+     Physical Exam        Vital Signs:   Vitals:    06/25/24 0917   BP: 112/72   Pulse: 84   SpO2: 98%   Weight: 85 kg (187 lb 6.4 oz)   Height: 170.2 cm (67.01\")     Body mass index is 29.34 kg/m².         Assessment / Plan      Assessment/Plan:   Diagnoses and all orders for this visit:    1. Blurred vision, right eye (Primary)  -     LESTER by IFA, Reflex 9-biomarkers profile; Future  -     Angiotensin Converting Enzyme; Future  -     Anti-Myelin Oligodendrocyte Glycoprotein (MOG), Serum; Future  -     Neuromyelitis Optica (NMO) Auto Antibody, IgG; Future  -     Vitamin D,25-Hydroxy; Future  -     Vitamin B12 & Folate; Future  -     Rheumatoid Factor; Future  -     Antiphosphatidylserine IgG / M; Future  -     C-reactive Protein; Future  -     ROBEL + PE; Future  -     Visual Evoked Potential Test; Future    2. Periodic headache syndrome, not intractable       Assessment & Plan  1. Periodic headache syndrome.  The patient's vital signs, including blood pressure and heart rate, are within normal limits. A comprehensive discussion was held regarding the importance of adhering to a regimen of at least 4 bottles of water daily, " abstaining from skipping meals, and ensuring 7 to 8 hours of rest per night. Should the patient experience severe or distinct headaches, he is to inform us immediately.    2. Blurry vision.  A vision evoked potentials test will be ordered to measure the activity and health of the nerve. Blood work will be conducted to assess for autoimmune diseases, specifically neuromyelitis optica disorder. Vitamin D and B12 levels will also be checked. Rheumatoid and inflammatory markers will also be checked. Should the labs and vision evoked potentials test yield normal results, a referral to Dr. Lee at , a neuro-ophthalmologist, will be considered.        Patient Education:       Reviewed medications, potential side effects and signs and symptoms to report. Discussed risk versus benefits of treatment plan with patient and/or family-including medications, labs and radiology that may be ordered. Addressed questions and concerns during visit. Patient and/or family verbalized understanding and agree with plan. Instructed to call the office with any questions and report to ER with any life-threatening symptoms.     Follow Up:   Return in about 3 months (around 9/25/2024) for Recheck.    During this visit the following were done:  Labs Reviewed [x]    Labs Ordered [x]    Radiology Reports Reviewed [x]    Radiology Ordered []    PCP Records Reviewed []    Referring Provider Records Reviewed []    ER Records Reviewed [x]    Hospital Records Reviewed []    History Obtained From Family [x]    Radiology Images Reviewed [x]    Other Reviewed []    Records Requested []      Patient or patient representative verbalized consent for the use of Ambient Listening during the visit with  Brody Hobbs DNP, CRISTO for chart documentation. 6/25/2024  08:34 EDT      Brody Hobbs DNP, APRN

## 2024-06-26 LAB
ACE SERPL-CCNC: 42 U/L (ref 14–82)
ALBUMIN SERPL ELPH-MCNC: 4.4 G/DL (ref 2.9–4.4)
ALBUMIN/GLOB SERPL: 1.6 {RATIO} (ref 0.7–1.7)
ALPHA1 GLOB SERPL ELPH-MCNC: 0.1 G/DL (ref 0–0.4)
ALPHA2 GLOB SERPL ELPH-MCNC: 0.6 G/DL (ref 0.4–1)
ANA SER QL IF: NEGATIVE
B-GLOBULIN SERPL ELPH-MCNC: 1 G/DL (ref 0.7–1.3)
GAMMA GLOB SERPL ELPH-MCNC: 1.1 G/DL (ref 0.4–1.8)
GLOBULIN SER-MCNC: 2.9 G/DL (ref 2.2–3.9)
IGA SERPL-MCNC: 191 MG/DL (ref 90–386)
IGG SERPL-MCNC: 1326 MG/DL (ref 603–1613)
IGM SERPL-MCNC: 63 MG/DL (ref 20–172)
INTERPRETATION SERPL IEP-IMP: NORMAL
LABORATORY COMMENT REPORT: NORMAL
LABORATORY COMMENT REPORT: NORMAL
M PROTEIN SERPL ELPH-MCNC: NORMAL G/DL
PROT SERPL-MCNC: 7.3 G/DL (ref 6–8.5)

## 2024-06-27 LAB
MOG AB SER QL CBA IFA: NEGATIVE
PS IGG SER-ACNC: 9 UNITS (ref 0–30)
PS IGM SER-ACNC: 12 UNITS (ref 0–30)

## 2024-06-28 LAB — AQP4 H2O CHANNEL IGG SERPL QL: NEGATIVE

## 2024-07-01 ENCOUNTER — TELEPHONE (OUTPATIENT)
Dept: NEUROLOGY | Facility: CLINIC | Age: 19
End: 2024-07-01
Payer: COMMERCIAL

## 2024-07-01 NOTE — TELEPHONE ENCOUNTER
Called patient and gave results.  Patient was understanding and appreciative.    ----- Message from Brody Hobbs sent at 7/1/2024 12:53 PM EDT -----  Please notify pt labs for autoimmune diseases that mimic MS, immune labs, connective tissue autoimmune diseases, inflammatory markers, vit b12, folate, rheumatoid factor are all normal.  Vit D is better but still low at 24.2. Continue vit D supplement

## 2024-07-02 ENCOUNTER — TELEPHONE (OUTPATIENT)
Dept: NEUROLOGY | Facility: CLINIC | Age: 19
End: 2024-07-02
Payer: COMMERCIAL

## 2024-07-02 DIAGNOSIS — H53.8 BLURRED VISION, RIGHT EYE: Primary | ICD-10-CM

## 2024-07-02 DIAGNOSIS — G43.C0 PERIODIC HEADACHE SYNDROME, NOT INTRACTABLE: ICD-10-CM

## 2024-07-02 NOTE — TELEPHONE ENCOUNTER
Caller: LUIS GRANADOS    Relationship: Mother    Best call back number: [phone]     What orders are you requesting (i.e. lab or imaging): REFERRAL FOR OPTHO OR MOM THINKS [facility]G TEST @ HOSPITAL (?)     In what timeframe would the patient need to come in: ASAP    Where will you receive your lab/imaging services: [facility] (PER OV NOTE) OR PROVIDER RECOMMENDATIONS    Additional notes: PATIENT TELEPHONED TO ADVISE SHE HAS NOT HEARD FROM   OFFICE TO SCHEDULE REFERRAL FOR PATIENTS EYE. SHE STATES IT WAS A TEST TO BE DONE AT THE HOSPITAL ([facility])?    REVIEW OF OV NOTE MENTIONS POSSIBLE REFERRAL FOR NEURO OPTHO @ [facility].    PLEASE REVIEW & CALL TO ADVISE-THANK YOU

## 2024-07-03 NOTE — TELEPHONE ENCOUNTER
Called and left  referral sent in.    Called and spoke with patient's mother Leanna.  She would like referral sent to Ophthalmologist as discussed with provider at last visit since labs were normal.  She stated that she is concerned about how long it will take to get an appointment at  and would like this done as soon as possible.

## 2024-08-06 ENCOUNTER — HOSPITAL ENCOUNTER (OUTPATIENT)
Dept: NEUROLOGY | Facility: HOSPITAL | Age: 19
Discharge: HOME | End: 2024-08-06
Admitting: NURSE PRACTITIONER
Payer: COMMERCIAL

## 2024-08-06 DIAGNOSIS — H53.8 BLURRED VISION, RIGHT EYE: ICD-10-CM

## 2024-08-06 PROCEDURE — 95930 VISUAL EP TEST CNS W/I&R: CPT | Performed by: PSYCHIATRY & NEUROLOGY

## 2024-08-06 PROCEDURE — 95930 VISUAL EP TEST CNS W/I&R: CPT

## 2024-08-07 ENCOUNTER — TELEPHONE (OUTPATIENT)
Dept: NEUROLOGY | Facility: CLINIC | Age: 19
End: 2024-08-07
Payer: COMMERCIAL

## 2024-08-07 NOTE — TELEPHONE ENCOUNTER
Caller: Srinivas Denney    Relationship: Self    Best call back number: [phone]    What was the call regarding: THE PATIENT RECEIVED HIS RESULTS OF VISUAL EVOKED TEST. THE RESULTS SAID THERE IS SOMETHING IN ONE OF HIS EYES. HE HAS AN APPT WITH THE NEURO OPHTHALMOLOGIST BUT ITS NOT UNTIL FEB. THEY WANT TO KNOW WHAT OTHER STEPS ARE NEXT OR SHOULD THEY PREPARE TO DO?    THE PATIENT STARTS SCHOOL SOON AND KNOWS HE WILL  NOT BE ABLE TO SEE THE BOARDS.     PLEASE REVIEW  THANK YOU

## 2024-08-08 ENCOUNTER — TELEPHONE (OUTPATIENT)
Dept: NEUROLOGY | Facility: CLINIC | Age: 19
End: 2024-08-08
Payer: COMMERCIAL

## 2024-08-08 DIAGNOSIS — H53.8 BLURRED VISION, RIGHT EYE: ICD-10-CM

## 2024-08-08 DIAGNOSIS — H46.9 BILATERAL OPTIC NEURITIS: Primary | ICD-10-CM

## 2024-08-08 NOTE — TELEPHONE ENCOUNTER
I called and spoke to pt and his mother. Discussed VEP revealed bilat optic neuritis.  Pt has appt with Dr Caicedo in Feb. Will proceed with LP and MRI c-spine for eval of MS. MRI brain is neg. Refer to ophthalmology.

## 2024-08-13 ENCOUNTER — TELEPHONE (OUTPATIENT)
Dept: NEUROLOGY | Facility: CLINIC | Age: 19
End: 2024-08-13
Payer: COMMERCIAL

## 2024-08-13 NOTE — TELEPHONE ENCOUNTER
REc'd note from [facility] and reviewed with Dr Shannon. Dr Shannon recommends pt complete LP at this time. I discussed with patient. He will proceed with LP.

## 2024-08-23 ENCOUNTER — TELEPHONE (OUTPATIENT)
Dept: INFUSION THERAPY | Facility: HOSPITAL | Age: 19
End: 2024-08-23
Payer: COMMERCIAL

## 2024-08-23 NOTE — DISCHARGE INSTRUCTIONS
You will need to rest in a reclined position for the remainder of today.  Avoid strenuous activities for the next 48 hours.  You may take the bandage (dressing) off and shower tomorrow.  Avoid tub baths for the next 4 to 5 days.  Do not drink alcohol for 24 hours, or as told by your doctor.  Drink enough fluid to keep your urine clear or pale yellow.  You will need to drink at least 12 ounces of fluid every hour while awake for the next 3 days.  Include caffeinated beverages especially if you are having a headache.  DO NOT DRIVE THE DAY OF YOUR PROCEDURE.

## 2024-08-23 NOTE — TELEPHONE ENCOUNTER
Contacted patient as pre-procedure call prior to planned lumbar puncture scheduled for 8/26/24. Reviewed with patient arrival to main registration between 7:30-7:45 am,  needed, nothing to eat after midnight but clear liquids okay. Plan on being here for procedure 4-5 hours so wear comfortable clothes. Reviewed medical history, medications, allergies and allowed time for questions. Patient will need an excuse for school when discharged Monday.    Good

## 2024-08-26 ENCOUNTER — HOSPITAL ENCOUNTER (OUTPATIENT)
Dept: GENERAL RADIOLOGY | Facility: HOSPITAL | Age: 19
Discharge: HOME | End: 2024-08-26
Admitting: NURSE PRACTITIONER
Payer: COMMERCIAL

## 2024-08-26 VITALS
RESPIRATION RATE: 16 BRPM | BODY MASS INDEX: 29.1 KG/M2 | HEIGHT: 67 IN | OXYGEN SATURATION: 97 % | SYSTOLIC BLOOD PRESSURE: 117 MMHG | HEART RATE: 77 BPM | TEMPERATURE: 97.3 F | DIASTOLIC BLOOD PRESSURE: 68 MMHG | WEIGHT: 185.4 LBS

## 2024-08-26 DIAGNOSIS — H53.8 BLURRED VISION, RIGHT EYE: ICD-10-CM

## 2024-08-26 DIAGNOSIS — H46.9 BILATERAL OPTIC NEURITIS: ICD-10-CM

## 2024-08-26 LAB
APPEARANCE CSF: CLEAR
APPEARANCE CSF: CLEAR
COLOR CSF: COLORLESS
COLOR CSF: COLORLESS
COLOR SPUN CSF: COLORLESS
COLOR SPUN CSF: COLORLESS
GLUCOSE CSF-MCNC: 58 MG/DL (ref 40–70)
HOLD SPECIMEN: NORMAL
PROT CSF-MCNC: 29.3 MG/DL (ref 15–45)
RBC # CSF MANUAL: 17 /MM3 (ref 0–5)
RBC # CSF MANUAL: 547 /MM3 (ref 0–5)
SPECIMEN VOL CSF: 11 ML
SPECIMEN VOL CSF: 11 ML
TUBE # CSF: 1
TUBE # CSF: 4
WBC # CSF MANUAL: 1 /MM3 (ref 0–5)
WBC # CSF MANUAL: 2 /MM3 (ref 0–5)

## 2024-08-26 PROCEDURE — 87102 FUNGUS ISOLATION CULTURE: CPT | Performed by: NURSE PRACTITIONER

## 2024-08-26 PROCEDURE — 89050 BODY FLUID CELL COUNT: CPT | Performed by: NURSE PRACTITIONER

## 2024-08-26 PROCEDURE — 87070 CULTURE OTHR SPECIMN AEROBIC: CPT | Performed by: NURSE PRACTITIONER

## 2024-08-26 PROCEDURE — 82164 ANGIOTENSIN I ENZYME TEST: CPT | Performed by: NURSE PRACTITIONER

## 2024-08-26 PROCEDURE — 87075 CULTR BACTERIA EXCEPT BLOOD: CPT | Performed by: NURSE PRACTITIONER

## 2024-08-26 PROCEDURE — 87206 SMEAR FLUORESCENT/ACID STAI: CPT | Performed by: NURSE PRACTITIONER

## 2024-08-26 PROCEDURE — 83916 OLIGOCLONAL BANDS: CPT | Performed by: NURSE PRACTITIONER

## 2024-08-26 PROCEDURE — 82040 ASSAY OF SERUM ALBUMIN: CPT | Performed by: NURSE PRACTITIONER

## 2024-08-26 PROCEDURE — 83873 ASSAY OF CSF PROTEIN: CPT | Performed by: NURSE PRACTITIONER

## 2024-08-26 PROCEDURE — 82784 ASSAY IGA/IGD/IGG/IGM EACH: CPT | Performed by: NURSE PRACTITIONER

## 2024-08-26 PROCEDURE — 82945 GLUCOSE OTHER FLUID: CPT | Performed by: NURSE PRACTITIONER

## 2024-08-26 PROCEDURE — 84157 ASSAY OF PROTEIN OTHER: CPT | Performed by: NURSE PRACTITIONER

## 2024-08-26 PROCEDURE — 87205 SMEAR GRAM STAIN: CPT | Performed by: NURSE PRACTITIONER

## 2024-08-26 PROCEDURE — 82042 OTHER SOURCE ALBUMIN QUAN EA: CPT | Performed by: NURSE PRACTITIONER

## 2024-08-26 PROCEDURE — 87015 SPECIMEN INFECT AGNT CONCNTJ: CPT | Performed by: NURSE PRACTITIONER

## 2024-08-26 RX ORDER — LIDOCAINE HYDROCHLORIDE 10 MG/ML
5 INJECTION, SOLUTION INFILTRATION; PERINEURAL ONCE
Status: DISCONTINUED | OUTPATIENT
Start: 2024-08-26 | End: 2024-08-27 | Stop reason: HOSPADM

## 2024-08-26 NOTE — POST-PROCEDURE NOTE
Radiology Procedure    Pre-procedure: procedure, risks discussed with patient. Patient indicated understanding and consented to procedure.     Procedure Performed: lumbar puncture     IV Sedation and/or Anesthesia:  no    Complications: none    Preliminary Findings: pending    Specimen Removed: 10cc blood tinged, clearing CSF    Estimated Blood Loss:  0ml    Post-Procedure Diagnosis: pending    Post-Procedure Plan: encourage fluids, bed rest x 2 hours    Standard Discharge Instructions Given:yes     MARTINEZ Kimball  08/26/24  09:27 EDT

## 2024-08-26 NOTE — NURSING NOTE
Pt discharged from ir dept s/p LP. Pt tolerated procedure without complications. Indicates readiness for discharge. Discharge instructions reviewed with patient and his mother, both verbalized understanding. Pt transported to exit via wheelchair per [facility].

## 2024-08-27 ENCOUNTER — TELEPHONE (OUTPATIENT)
Dept: INFUSION THERAPY | Facility: HOSPITAL | Age: 19
End: 2024-08-27
Payer: COMMERCIAL

## 2024-08-27 ENCOUNTER — TELEPHONE (OUTPATIENT)
Dept: NEUROLOGY | Facility: CLINIC | Age: 19
End: 2024-08-27
Payer: COMMERCIAL

## 2024-08-27 LAB
GIE STN SPEC: NORMAL
REF LAB TEST METHOD: NORMAL

## 2024-08-27 NOTE — TELEPHONE ENCOUNTER
Called patient and left  with results.      ----- Message from Brody Hobbs sent at 8/27/2024  9:59 AM EDT -----  Please let pt know so far his spinal fluid labs are normal. We are waiting for other labs to return.

## 2024-08-27 NOTE — TELEPHONE ENCOUNTER
PATIENT CALLING - ADVISED PT TO LISTEN TO MESSAGE FOR DETAILED INFORMATION - PT ACKNOWLEDGES UNDERSTANDING.

## 2024-08-28 LAB — ACE CSF-CCNC: <1.5 U/L (ref 0–2.5)

## 2024-08-29 LAB
BACTERIA SPEC AEROBE CULT: NORMAL
GRAM STN SPEC: NORMAL
GRAM STN SPEC: NORMAL

## 2024-08-30 LAB
ALB CSF/SERPL: 4 {RATIO} (ref 0–8)
ALBUMIN CSF-MCNC: 17 MG/DL (ref 10–45)
ALBUMIN SERPL-MCNC: 4.6 G/DL (ref 4.3–5.2)
IGG CSF-MCNC: 2.7 MG/DL (ref 0–10.3)
IGG SERPL-MCNC: 1317 MG/DL (ref 603–1613)
IGG SYNTH RATE SER+CSF CALC-MRATE: -2.5 MG/DAY
IGG/ALB CLEAR SER+CSF-RTO: 0.6 (ref 0–0.7)
IGG/ALB CSF: 0.16 {RATIO} (ref 0–0.25)
MBP CSF-MCNC: 1.5 NG/ML (ref 0–3.8)
OLIGOCLONAL BANDS.IT SER+CSF QL: NORMAL

## 2024-08-31 LAB — BACTERIA SPEC ANAEROBE CULT: NORMAL

## 2024-09-03 ENCOUNTER — TELEPHONE (OUTPATIENT)
Dept: NEUROLOGY | Facility: CLINIC | Age: 19
End: 2024-09-03
Payer: COMMERCIAL

## 2024-09-03 NOTE — TELEPHONE ENCOUNTER
Called patient and gave results.  Patient was understanding and appreciative.    ----- Message from Brody Hobbs sent at 8/30/2024  4:32 PM EDT -----  Please notify pt spinal fluid is negative for MS.

## 2024-09-09 ENCOUNTER — TELEPHONE (OUTPATIENT)
Dept: NEUROLOGY | Facility: CLINIC | Age: 19
End: 2024-09-09
Payer: COMMERCIAL

## 2024-09-09 DIAGNOSIS — G36.0 NEUROMYELITIS OPTICA SPECTRUM DISORDER: Primary | ICD-10-CM

## 2024-09-09 RX ORDER — DIPHENHYDRAMINE HYDROCHLORIDE 50 MG/ML
50 INJECTION INTRAMUSCULAR; INTRAVENOUS AS NEEDED
Status: CANCELLED | OUTPATIENT
Start: 2024-09-09

## 2024-09-09 RX ORDER — PREDNISONE 10 MG/1
10 TABLET ORAL 2 TIMES DAILY
Qty: 60 TABLET | Refills: 2 | Status: SHIPPED | OUTPATIENT
Start: 2024-09-09

## 2024-09-09 RX ORDER — ACETAMINOPHEN 325 MG/1
650 TABLET ORAL ONCE
Status: CANCELLED | OUTPATIENT
Start: 2024-09-09

## 2024-09-09 RX ORDER — FAMOTIDINE 10 MG/ML
20 INJECTION, SOLUTION INTRAVENOUS AS NEEDED
Status: CANCELLED | OUTPATIENT
Start: 2024-09-09

## 2024-09-09 RX ORDER — SODIUM CHLORIDE 9 MG/ML
20 INJECTION, SOLUTION INTRAVENOUS ONCE
Status: CANCELLED | OUTPATIENT
Start: 2024-09-09

## 2024-09-09 NOTE — PROGRESS NOTES
Case discussed with Dr Shannon.  19 year old pt with bilat optic neuritis diagnosed on visual evoked potential test.  LP  CSF with zero OCB.  He has persistent vision loss OD.  He has been referred to ophthalmology.  Will start prednisone. 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

## 2024-09-09 NOTE — TELEPHONE ENCOUNTER
Spoke to pt and mother. Will start steroids and wait for Rituxan. Keep appt with Dr Caicedo in Feb for second opinion.

## 2024-09-10 ENCOUNTER — TELEPHONE (OUTPATIENT)
Dept: NEUROLOGY | Facility: CLINIC | Age: 19
End: 2024-09-10
Payer: COMMERCIAL

## 2024-09-10 DIAGNOSIS — H46.9 BILATERAL OPTIC NEURITIS: Primary | ICD-10-CM

## 2024-09-10 NOTE — TELEPHONE ENCOUNTER
Caller: LUIS GRANADOS    Relationship: Mother    Best call back number: [phone]     What is the medical concern/diagnosis: OPTIC NEURITIS     What specialty or service is being requested: NEURO OPHTHALMOLOGY     What is the provider, practice or medical service name: DR SHAJI HULL @ [facility]     What is the office location: [place], [place]     What is the office phone number: [phone]    Any additional details: THEY SAID THE FAX THE REFERRAL -[phone] ATTN DR HULL    THEY CAN GET THE PATIENT IN FOR A CONSULT AS EARLY AS THE SECOND WEEK IN NOVEMBER.     PLEASE REVIEW  THANK YOU

## 2024-09-16 RX ORDER — FAMOTIDINE 10 MG/ML
20 INJECTION, SOLUTION INTRAVENOUS AS NEEDED
OUTPATIENT
Start: 2024-09-16

## 2024-09-16 RX ORDER — ACETAMINOPHEN 325 MG/1
650 TABLET ORAL ONCE
OUTPATIENT
Start: 2024-09-16

## 2024-09-16 RX ORDER — DIPHENHYDRAMINE HYDROCHLORIDE 50 MG/ML
50 INJECTION INTRAMUSCULAR; INTRAVENOUS AS NEEDED
OUTPATIENT
Start: 2024-09-16

## 2024-09-16 RX ORDER — SODIUM CHLORIDE 9 MG/ML
20 INJECTION, SOLUTION INTRAVENOUS ONCE
OUTPATIENT
Start: 2024-09-16

## 2024-09-19 ENCOUNTER — TELEPHONE (OUTPATIENT)
Dept: NEUROLOGY | Facility: CLINIC | Age: 19
End: 2024-09-19

## 2024-09-19 NOTE — TELEPHONE ENCOUNTER
Per pt Dr. Lee said patient does not have the diagnosis we gave so he does not need to continue with our treatment plan. I was calling patient to have him get lab work drawn so we could get him scheduled for infusion. Informed patient I would make provider aware.

## 2024-10-07 LAB — FUNGUS WND CULT: NORMAL

## 2025-06-09 ENCOUNTER — OFFICE VISIT (OUTPATIENT)
Dept: INTERNAL MEDICINE | Facility: CLINIC | Age: 20
End: 2025-06-09
Payer: COMMERCIAL

## 2025-06-09 VITALS
BODY MASS INDEX: 30.92 KG/M2 | WEIGHT: 197 LBS | DIASTOLIC BLOOD PRESSURE: 74 MMHG | HEART RATE: 60 BPM | HEIGHT: 67 IN | TEMPERATURE: 98.7 F | SYSTOLIC BLOOD PRESSURE: 116 MMHG

## 2025-06-09 DIAGNOSIS — R21 RASH: Primary | ICD-10-CM

## 2025-06-09 PROCEDURE — 99213 OFFICE O/P EST LOW 20 MIN: CPT | Performed by: STUDENT IN AN ORGANIZED HEALTH CARE EDUCATION/TRAINING PROGRAM

## 2025-06-09 RX ORDER — HYDROXYZINE HYDROCHLORIDE 25 MG/1
25 TABLET, FILM COATED ORAL NIGHTLY PRN
Qty: 30 TABLET | Refills: 0 | Status: SHIPPED | OUTPATIENT
Start: 2025-06-09

## 2025-06-09 RX ORDER — EPINEPHRINE 0.3 MG/.3ML
INJECTION SUBCUTANEOUS
COMMUNITY
Start: 2025-02-11

## 2025-06-09 RX ORDER — PREDNISONE 10 MG/1
TABLET ORAL
Qty: 14 TABLET | Refills: 0 | Status: SHIPPED | OUTPATIENT
Start: 2025-06-09 | End: 2025-06-16

## 2025-06-09 NOTE — PROGRESS NOTES
"Chief Complaint  Srinivas Denney is a 20 y.o. male presenting for Rash.       Born in [facility], moved to [place] when he was 3 months old. Moved to [place] 2019. Lives with parents. Single. 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     Patient has a past Chiara history of mild intermittent asthma, seasonal allergies and acne (was on Accutane by Derm).    History of Present Illness  Patient is here for acute visit.  He got sick about 2 weeks ago with respiratory infection, congestion.  He tested negative for COVID at home.  He developed itchy, burning rash that started 5/27/2025, and this rash has increased, more on the left side of his abdomen, but also on both forearms.  Quite itchy, itches at night.  He has been applying triamcinolone with limited relief, and also applied Benadryl topically.    The following portions of the patient's history were reviewed and updated as appropriate: allergies, current medications, past family history, past medical history, past social history, past surgical history, and problem list.    Image captured per patient verbal consent:            Objective  /74 (BP Location: Left arm, Patient Position: Sitting, Cuff Size: Large Adult)   Pulse 60   Temp 98.7 °F (37.1 °C) (Temporal)   Ht 170.2 cm (67.01\")   Wt 89.4 kg (197 lb)   BMI 30.85 kg/m²     Physical Exam  Constitutional:       Appearance: Normal appearance.   HENT:      Head: Normocephalic and atraumatic.   Eyes:      Extraocular Movements: Extraocular movements intact.      Conjunctiva/sclera: Conjunctivae normal.   Pulmonary:      Effort: Pulmonary effort is normal. No respiratory distress.   Musculoskeletal:      Cervical back: Neck supple.   Skin:     General: Skin is warm and dry.      Findings: Rash present.   Neurological:      Mental Status: He is alert and oriented to person, place, and time. Mental status is at baseline.   Psychiatric:         Behavior: Behavior normal.         Thought Content: Thought " content normal.         Assessment/Plan   1. Rash  Suspect some sort of viral rash.  Will do trial of Atarax/hydroxyzine at night.  Recommend using Zyrtec or similar during the daytime.  Avoid strong soaps, avoid scratching.  Recommend moisturizing with lotion.  Continue use of triamcinolone.   if rash continues or gets worse over the next couple of weeks or beyond, recommend evaluation by dermatology.  He can send me a [employer] message if needed.  He is also welcome to come back in for a visit if needed  - hydrOXYzine (ATARAX) 25 MG tablet; Take 1 tablet by mouth At Night As Needed for Itching.  Dispense: 30 tablet; Refill: 0  - predniSONE (DELTASONE) 10 MG tablet; 40 mg day 1-2. 20 mg day 3-4, 10 mg day 5-6  Dispense: 14 tablet; Refill: 0      Return if symptoms worsen or fail to improve, for Annual physical.    Future Appointments         Provider Department Center    6/11/2025 9:15 AM (Arrive by 9:00 AM) Janes Smith MD [facility]              Janes Smith MD  Family Medicine  06/09/2025

## 2025-06-11 ENCOUNTER — LAB (OUTPATIENT)
Dept: LAB | Facility: HOSPITAL | Age: 20
End: 2025-06-11
Payer: COMMERCIAL

## 2025-06-11 ENCOUNTER — OFFICE VISIT (OUTPATIENT)
Dept: INTERNAL MEDICINE | Facility: CLINIC | Age: 20
End: 2025-06-11
Payer: COMMERCIAL

## 2025-06-11 VITALS
BODY MASS INDEX: 30.76 KG/M2 | SYSTOLIC BLOOD PRESSURE: 122 MMHG | OXYGEN SATURATION: 98 % | HEART RATE: 76 BPM | DIASTOLIC BLOOD PRESSURE: 80 MMHG | HEIGHT: 67 IN | WEIGHT: 196 LBS

## 2025-06-11 DIAGNOSIS — Z00.00 WELL ADULT EXAM: Primary | ICD-10-CM

## 2025-06-11 DIAGNOSIS — R21 RASH: ICD-10-CM

## 2025-06-11 DIAGNOSIS — J30.1 SEASONAL ALLERGIC RHINITIS DUE TO POLLEN: Chronic | ICD-10-CM

## 2025-06-11 DIAGNOSIS — Z23 NEED FOR PNEUMOCOCCAL VACCINATION: ICD-10-CM

## 2025-06-11 DIAGNOSIS — E55.9 VITAMIN D DEFICIENCY: ICD-10-CM

## 2025-06-11 DIAGNOSIS — J45.20 MILD INTERMITTENT ASTHMA WITHOUT COMPLICATION: Chronic | ICD-10-CM

## 2025-06-11 DIAGNOSIS — E78.2 ELEVATED CHOLESTEROL WITH ELEVATED TRIGLYCERIDES: ICD-10-CM

## 2025-06-11 DIAGNOSIS — E66.811 OBESITY, CLASS I, BMI 30-34.9: ICD-10-CM

## 2025-06-11 DIAGNOSIS — B02.9 HERPES ZOSTER WITHOUT COMPLICATION: ICD-10-CM

## 2025-06-11 LAB
25(OH)D3 SERPL-MCNC: 23.9 NG/ML (ref 30–100)
ALBUMIN SERPL-MCNC: 4.8 G/DL (ref 3.5–5.2)
ALBUMIN/GLOB SERPL: 1.6 G/DL
ALP SERPL-CCNC: 88 U/L (ref 39–117)
ALT SERPL W P-5'-P-CCNC: 23 U/L (ref 1–41)
ANION GAP SERPL CALCULATED.3IONS-SCNC: 12.5 MMOL/L (ref 5–15)
AST SERPL-CCNC: 22 U/L (ref 1–40)
BILIRUB SERPL-MCNC: 0.3 MG/DL (ref 0–1.2)
BUN SERPL-MCNC: 15 MG/DL (ref 6–20)
BUN/CREAT SERPL: 16 (ref 7–25)
CALCIUM SPEC-SCNC: 9.6 MG/DL (ref 8.6–10.5)
CHLORIDE SERPL-SCNC: 106 MMOL/L (ref 98–107)
CHOLEST SERPL-MCNC: 167 MG/DL (ref 0–200)
CO2 SERPL-SCNC: 22.5 MMOL/L (ref 22–29)
CREAT SERPL-MCNC: 0.94 MG/DL (ref 0.76–1.27)
EGFRCR SERPLBLD CKD-EPI 2021: 118.3 ML/MIN/1.73
GLOBULIN UR ELPH-MCNC: 3 GM/DL
GLUCOSE SERPL-MCNC: 88 MG/DL (ref 65–99)
HDLC SERPL-MCNC: 47 MG/DL (ref 40–60)
LDLC SERPL CALC-MCNC: 90 MG/DL (ref 0–100)
LDLC/HDLC SERPL: 1.81 {RATIO}
POTASSIUM SERPL-SCNC: 3.7 MMOL/L (ref 3.5–5.2)
PROT SERPL-MCNC: 7.8 G/DL (ref 6–8.5)
SODIUM SERPL-SCNC: 141 MMOL/L (ref 136–145)
TRIGL SERPL-MCNC: 174 MG/DL (ref 0–150)
VLDLC SERPL-MCNC: 30 MG/DL (ref 5–40)

## 2025-06-11 PROCEDURE — 80061 LIPID PANEL: CPT

## 2025-06-11 PROCEDURE — 80053 COMPREHEN METABOLIC PANEL: CPT

## 2025-06-11 PROCEDURE — 82306 VITAMIN D 25 HYDROXY: CPT

## 2025-06-11 RX ORDER — ALBUTEROL SULFATE 90 UG/1
2 INHALANT RESPIRATORY (INHALATION) EVERY 4 HOURS PRN
Qty: 18 G | Refills: 3 | Status: SHIPPED | OUTPATIENT
Start: 2025-06-11

## 2025-06-11 RX ORDER — VALACYCLOVIR HYDROCHLORIDE 1 G/1
1000 TABLET, FILM COATED ORAL 3 TIMES DAILY
Qty: 21 TABLET | Refills: 0 | Status: SHIPPED | OUTPATIENT
Start: 2025-06-11 | End: 2025-06-18

## 2025-06-11 NOTE — PROGRESS NOTES
"Chief Complaint  Srinivas Denney is a 20 y.o. male presenting for Annual Exam (Patient is not fasting).     Born in [facility], moved to [place] when he was 3 months old. Moved to [place] 2019. Lives with parents. Single. Per 2025 at , considering marketing studies.     Patient has a past medical history of mild intermittent asthma, seasonal allergies, elevated triglycerides, vitamin D deficiency and acne (was on Accutane by Derm).    History of Present Illness  Patient is here for annual physical and follow-up.    He tells me that he did develop a limited rash of his left forearm about 3 days ago, at the same time also rash of the left side of his face, see images below.  Mild burning and irritation.  The other rash has not changed too much.  He is requesting referral to dermatology, previously seen at [facility] in 2022 for acne.    He also is concerned about his allergies.  Has been getting allergy shots at [facility] weekly since January.  He does not feel any improvement, possibly worse.    He has no follow-up with pulmonology.  He uses albuterol inhaler about 2 times weekly on average, sometimes more if he exercises/does sports, or during allergy season.      He stopped taking vitamin D a few months ago, was taking 50,000 units weekly.    He goes to the dentist about once yearly.  He exercises daily, rides the bike with his dog for about 4 miles every day.  Also play some baseball.    The following portions of the patient's history were reviewed and updated as appropriate: allergies, current medications, past family history, past medical history, past social history, past surgical history, and problem list.    Images captured per patient verbal consent:            Objective  /80 (BP Location: Left arm, Patient Position: Sitting, Cuff Size: Adult)   Pulse 76   Ht 170.2 cm (67\")   Wt 88.9 kg (196 lb)   SpO2 98%   BMI 30.70 kg/m²     Physical Exam  Vitals reviewed.   Constitutional: "       Appearance: Normal appearance.   HENT:      Head: Normocephalic and atraumatic.      Right Ear: Tympanic membrane, ear canal and external ear normal. There is no impacted cerumen.      Left Ear: Tympanic membrane, ear canal and external ear normal. There is no impacted cerumen.      Nose: Nose normal. No congestion.      Mouth/Throat:      Mouth: Mucous membranes are moist.      Pharynx: Oropharynx is clear.   Eyes:      Extraocular Movements: Extraocular movements intact.      Conjunctiva/sclera: Conjunctivae normal.   Cardiovascular:      Rate and Rhythm: Normal rate and regular rhythm.      Heart sounds: Normal heart sounds. No murmur heard.  Pulmonary:      Effort: Pulmonary effort is normal.      Breath sounds: Normal breath sounds.   Abdominal:      General: There is no distension.      Palpations: Abdomen is soft. There is no mass.      Tenderness: There is no abdominal tenderness.   Musculoskeletal:      Cervical back: Neck supple. No tenderness.      Right lower leg: No edema.      Left lower leg: No edema.   Lymphadenopathy:      Cervical: No cervical adenopathy.   Skin:     General: Skin is warm and dry.      Findings: Rash present.   Neurological:      Mental Status: He is alert and oriented to person, place, and time. Mental status is at baseline.   Psychiatric:         Behavior: Behavior normal.         Thought Content: Thought content normal.         Assessment/Plan   1. Well adult exam  Counseled on recommendations for annual flu shot, COVID boosters and also pneumonia vaccine due to his history of asthma.  Patient would like to get the pneumonia vaccine today.  Counseled on recommendations for regular exercise, patient by far exceeds basic recommendations of 2.5 hours weekly of moderate intensity exercise.  Counseled on recommendations for regular dental visits, at least annually, ideally twice yearly.    2. Herpes zoster without complication  Fairly limited on left forearm and also left face,  this could be shingles, and he is about 3 days out.  With that possibility I recommend treating with Valtrex for 7 days.  - valACYclovir (Valtrex) 1000 MG tablet; Take 1 tablet by mouth 3 (Three) Times a Day for 7 days.  Dispense: 21 tablet; Refill: 0    3. Rash  See also previous note.  Still having rash.  On steroids at the moment.  Continue use of triamcinolone.  Patient is interested in seeing dermatology again.  - Ambulatory Referral to Dermatology    4. Mild intermittent asthma without complication  Stable and well-controlled with albuterol.  If he would start using more than twice weekly on average, consider adding inhaled corticosteroids.  - albuterol sulfate  (90 Base) MCG/ACT inhaler; Inhale 2 puffs Every 4 (Four) Hours As Needed for Wheezing.  Dispense: 18 g; Refill: 3    5. Seasonal allergic rhinitis due to pollen  Possibly worsening when he is getting immunotherapy by allergy.  I have recommended patient discuss his concern with them to see if it is still recommended and appropriate to continue.    6. Elevated cholesterol with elevated triglycerides  Mildly elevated triglycerides.  Will recheck blood work.  - Comprehensive Metabolic Panel; Future  - Lipid Panel; Future    7. Vitamin D deficiency  Patient has been off vitamin D for few months.  He may need to take a lower dose for prevention.  Will recheck levels.  - Vitamin D,25-Hydroxy; Future    8. Need for pneumococcal vaccination  Administered today  - Pneumococcal Conjugate Vaccine 21-Valent All    9. Obesity, Class I, BMI 30-34.9  Wt Readings from Last 3 Encounters:   06/11/25 88.9 kg (196 lb)   06/09/25 89.4 kg (197 lb)   08/26/24 84.1 kg (185 lb 6.4 oz)   BMI is >= 30 and <35. (Class 1 Obesity). The following options were offered after discussion;: exercise counseling/recommendations  Patient has athletic body habitus and would not be considered obese, possibly mildly overweight.      Return in about 1 year (around 6/11/2026), or if  symptoms worsen or fail to improve, for Annual physical.    Future Appointments         Provider Department Center    6/12/2026 9:00 AM (Arrive by 8:45 AM) Janes Smith MD [facility] INTERNAL MEDICINE [facility]              Janes Smith MD  Family Medicine  06/11/2025

## 2025-06-11 NOTE — LETTER
[facility]  Vaccine Consent Form    Patient Name:  Srinivas Denney  Patient :  2005  MRN:  [id number]     Vaccine(s) Ordered    Pneumococcal Conjugate Vaccine 21-Valent All        Screening Checklist  The following questions should be completed prior to vaccination. If you answer “yes” to any question, it does not necessarily mean you should not be vaccinated. It just means we may need to clarify or ask more questions. If a question is unclear, please ask your healthcare provider to explain it.    Yes No   Any fever or moderate to severe illness today (mild illness and/or antibiotic treatment are not contraindications)?     Do you have a history of a serious reaction to any previous vaccinations, such as anaphylaxis, encephalopathy within 7 days, Guillain-Fulton syndrome within 6 weeks, seizure?     Have you received any live vaccine(s) (e.g MMR, SORAYA) or any other vaccines in the last month (to ensure duplicate doses aren't given)?     Do you have an anaphylactic allergy to latex (DTaP, DTaP-IPV, Hep A, Hep B, MenB, RV, Td, Tdap), baker’s yeast (Hep B, HPV), polysorbates (RSV, nirsevimab, PCV 20 and 21, Rotavirrus, Tdap, Shingrix), or gelatin (SORAYA, MMR)?     Do you have an anaphylactic allergy to neomycin (Rabies, SORAYA, MMR, IPV, Hep A), polymyxin B (IPV), or streptomycin (IPV)?      Any cancer, leukemia, AIDS, or other immune system disorder? (SORAYA, MMR, RV)     Do you have a parent, brother, or sister with an immune system problem (if immune competence of vaccine recipient clinically verified, can proceed)? (MMR, SORAYA)     Any recent steroid treatments for >2 weeks, chemotherapy, or radiation treatment? (SORAYA, MMR)     Have you received antibody-containing blood transfusions or IVIG in the past 11 months (recommended interval is dependent on product)? (MMR, SORAYA)     Have you taken antiviral drugs (acyclovir, famciclovir, valacyclovir for SORAYA) in the last 24 or 48 hours, respectively?      Are you pregnant  "or planning to become pregnant within 1 month? (SORAYA, MMR, HPV, IPV, MenB, Abrexvy; For Hep B- refer to Engerix-B; For RSV - Abrysvo is indicated for 32-36 weeks of pregnancy from September to January)     For infants, have you ever been told your child has had intussusception or a medical emergency involving obstruction of the intestine (Rotavirus)? If not for an infant, can skip this question.         *Ordering Physicians/APC should be consulted if \"yes\" is checked by the patient or guardian above.  I have received, read, and understand the Vaccine Information Statement (VIS) for each vaccine ordered.  I have considered my or my child's health status as well as the health status of my close contacts.  I have taken the opportunity to discuss my vaccine questions with my or my child's health care provider.   I have requested that the ordered vaccine(s) be given to me or my child.  I understand the benefits and risks of the vaccines.  I understand that I should remain in the clinic for 15 minutes after receiving the vaccine(s).  _________________________________________________________  Signature of Patient or Parent/Legal Guardian ____________________  Date     "